# Patient Record
Sex: MALE | Race: WHITE | Employment: FULL TIME | URBAN - METROPOLITAN AREA
[De-identification: names, ages, dates, MRNs, and addresses within clinical notes are randomized per-mention and may not be internally consistent; named-entity substitution may affect disease eponyms.]

---

## 2019-06-19 ENCOUNTER — APPOINTMENT (OUTPATIENT)
Dept: GENERAL RADIOLOGY | Age: 44
End: 2019-06-19
Payer: COMMERCIAL

## 2019-06-19 ENCOUNTER — HOSPITAL ENCOUNTER (EMERGENCY)
Age: 44
Discharge: HOME | End: 2019-06-19
Payer: COMMERCIAL

## 2019-06-19 VITALS
TEMPERATURE: 98.2 F | HEART RATE: 92 BPM | DIASTOLIC BLOOD PRESSURE: 79 MMHG | OXYGEN SATURATION: 96 % | WEIGHT: 195 LBS | BODY MASS INDEX: 24.25 KG/M2 | SYSTOLIC BLOOD PRESSURE: 99 MMHG | HEIGHT: 75 IN | RESPIRATION RATE: 16 BRPM

## 2019-06-19 DIAGNOSIS — S69.92XA INJURY OF LEFT THUMB, INITIAL ENCOUNTER: ICD-10-CM

## 2019-06-19 DIAGNOSIS — S61.012A LACERATION OF LEFT THUMB WITHOUT FOREIGN BODY WITHOUT DAMAGE TO NAIL, INITIAL ENCOUNTER: Primary | ICD-10-CM

## 2019-06-19 PROCEDURE — 73140 X-RAY EXAM OF FINGER(S): CPT

## 2019-06-19 PROCEDURE — 99282 EMERGENCY DEPT VISIT SF MDM: CPT

## 2019-06-19 PROCEDURE — 6370000000 HC RX 637 (ALT 250 FOR IP): Performed by: PHYSICIAN ASSISTANT

## 2019-06-19 PROCEDURE — 4500000027

## 2019-06-19 RX ORDER — BACITRACIN ZINC AND POLYMYXIN B SULFATE 500; 1000 [USP'U]/G; [USP'U]/G
OINTMENT TOPICAL
Qty: 1 TUBE | Refills: 1 | Status: SHIPPED | OUTPATIENT
Start: 2019-06-19

## 2019-06-19 RX ORDER — DIAPER,BRIEF,INFANT-TODD,DISP
EACH MISCELLANEOUS ONCE
Status: COMPLETED | OUTPATIENT
Start: 2019-06-19 | End: 2019-06-19

## 2019-06-19 RX ADMIN — BACITRACIN ZINC: 500 OINTMENT TOPICAL at 17:27

## 2019-06-19 ASSESSMENT — PAIN SCALES - GENERAL: PAINLEVEL_OUTOF10: 5

## 2019-06-19 ASSESSMENT — PAIN DESCRIPTION - ONSET: ONSET: SUDDEN

## 2019-06-19 ASSESSMENT — PAIN DESCRIPTION - LOCATION: LOCATION: FINGER (COMMENT WHICH ONE)

## 2019-06-19 ASSESSMENT — PAIN DESCRIPTION - ORIENTATION: ORIENTATION: LEFT

## 2019-06-19 ASSESSMENT — PAIN DESCRIPTION - PAIN TYPE: TYPE: ACUTE PAIN

## 2019-06-19 ASSESSMENT — PAIN DESCRIPTION - FREQUENCY: FREQUENCY: CONTINUOUS

## 2019-06-19 ASSESSMENT — PAIN DESCRIPTION - DESCRIPTORS: DESCRIPTORS: THROBBING

## 2019-06-19 NOTE — ED PROVIDER NOTES
eMERGENCY dEPARTMENT eNCOUnter        PCP: No primary care provider on file. CHIEF COMPLAINT    Chief Complaint   Patient presents with    Laceration     left thumb       HPI    Ally Bishop is a 40 y.o. male who presents with laceration to left thumb. Context is patient was using a power drill when it slipped off the screw, puncturing the medial aspect of his left thumb, adjacent to the nailbed. Injury occurred immediately prior to arrival.  Pain severity 5 out of 10 without radiation described as a throbbing. Patient denies any other associated injuries. Patient denies distal numbness, tingling, weakness, functional/motor deficit. Patient denies foreign body sensation. Patient endorses tetanus status being up to date. REVIEW OF SYSTEMS    General:   Denies symptoms preceding injury. Denies syncope. Skin: + Laceration. See HPI. Musculoskeletal:  No distal numbness, tingling. No obvious tendon or motor deficits. Denies any other musculoskeletal injuries or skin trauma. All other review of systems are negative  See HPI and nursing notes for additional information     [address]    History reviewed. No pertinent past medical history. History reviewed. No pertinent surgical history. CURRENT MEDICATIONS        ALLERGIES    No Known Allergies    SOCIAL & FAMILY HISTORY    Social History     Socioeconomic History    Marital status:      Spouse name: None    Number of children: None    Years of education: None    Highest education level: None   Occupational History    None   Social Needs    Financial resource strain: None    Food insecurity:     Worry: None     Inability: None    Transportation needs:     Medical: None     Non-medical: None   Tobacco Use    Smoking status: Never Smoker    Smokeless tobacco: Never Used   Substance and Sexual Activity    Alcohol use: Yes     Comment:  Occ    Drug use: Never    Sexual activity: None   Lifestyle    Physical activity:     Days per week: None     Minutes per session: None    Stress: None   Relationships    Social connections:     Talks on phone: None     Gets together: None     Attends Synagogue service: None     Active member of club or organization: None     Attends meetings of clubs or organizations: None     Relationship status: None    Intimate partner violence:     Fear of current or ex partner: None     Emotionally abused: None     Physically abused: None     Forced sexual activity: None   Other Topics Concern    None   Social History Narrative    None     History reviewed. No pertinent family history. PHYSICAL EXAM    VITAL SIGNS: BP 99/79   Pulse 92   Temp 98.2 °F (36.8 °C) (Oral)   Resp 16   Ht 6' 3\" (1.905 m)   Wt 195 lb (88.5 kg)   SpO2 96%   BMI 24.37 kg/m²   Constitutional:  Well developed, Appears comfortable  HEENT:  Normocephalic, Atraumatic. PERRL, EOMI. Ear canals, nasal passages, oropharynx clear of blood or clear fluid. Musculoskeletal:  No gross deformities. No motor deficits. Distal sensation and capillary refill intact. Affected hand with full abduction and adduction of digits. Full flexion and extension. Vascular: Distal pulses and capillary refill intact. Integument:     There is a laceration to left thumb along medial nail fold measuring approximately 0.5 centimeters in length without obvious tendon involvement or foreign body on initial inspection. No subungual hematoma. Distal sensation, capillary refill intact. Distal joints with full range of motion. See below for further details. Pad of digit with small area of bruising with laceration noted, approximately 0.5 cm in length with small amount of active bleeding. No evidence of tendon involvement or foreign body on initial inspection. Wound appears superficial, not a through and through wound. Neurologic:  Awake and alert, normal flow of speech. CN 2-12 intact.   Sensation intact along superficial branch of radial nerve, median nerve and ulnar nerve. Psychiatric: Cooperative, pleasant affect        RADIOLOGY/PROCEDURES    XR FINGER LEFT (MIN 2 VIEWS)   Final Result   No acute radiographic finding in the left hand.           ________________________________________________________________________       Procedure Note - Lynnette Chow PA-C      Laceration Repair Procedure Note    Indication: Skin Laceration - left thumb    Procedure:   - Procedure explained, including risks and benefits explained to the patient who expressed understanding. All questions were answered. Verbal consent obtained. Time out was performed prior to starting the procedure. - The Wound was prepped and draped in the usual sterile fashion using Betadine and sterile saline.  - The wound is anesthetized using digital block with 2% lidocaine without epinephrine, approximately 5 mL  - Wound was explored to it's depth,  no compromise of neurovascular or tendon structures, no foreign bodies. - Wound was irrigated with copious amounts of sterile saline and mechanically debrided utilizing sterile gauze and sterile Q-tips. - Nail fold closed with 4-0 Prolene sutures, total number of 2, simple interrupted  - The pad of digit laceration was closed with 4-0 prolene sutures, total number of 1,  simple interrupted  - Hemostasis and good cosmesis was achieved. Blood loss minimal.  - The wound area was then dressed with sterile nonstick dressing, sterile gauze, and tape. - Patient tolerated procedure well without complications. Total repaired wound length: 1 cm  ________________________________________________________________________            ED COURSE & MEDICAL DECISION MAKING       Vital signs and nursing notes reviewed during ED course. I have independently evaluated this patient . Supervising MD present in the Emergency Department, available for consultation, throughout entirety of  patient care.      Patient presents today for laceration which was repaired while in ED today. He has wound to lateral nail fold as well as to pad of digit, does not appear to be a through and through laceration, both wounds are superficial.  Imaging without acute osseous abnormality. Patient believes he is up-to-date on tetanus, this is not administered today. I discussed possibility of infection, retained foreign body, tendon injury, nerve injury. I discussed wound care instructions today with patient and/or family. Patient and/or family agrees to having a wound check in 2-3 days and suture/staple removal in 7-10 days (which we discussed today). We also discussed scars and ways to minimize scarring including but not limited to protection of scar from sunlight for one year and massaging of scar tissue. Disposition, diagnosis, and plan discussed at bedside with patient and/or the family today who understands and agrees. Return to emergency department precautions were discussed in detail with patient and/or family who understands and agrees to return for new or worsening symptoms including but not limited to numbness, weakness, tingling, fever, chills, redness around wound, streaking redness proximal or distal to wound, purulent drainage from wound, nausea, vomiting, joint redness, joint swelling. Clinical  IMPRESSION    1. Laceration of left thumb without foreign body without damage to nail, initial encounter    2. Injury of left thumb, initial encounter           Comment: Please note this report has been produced using speech recognition software and may contain errors related to that system including errors in grammar, punctuation, and spelling, as well as words and phrases that may be inappropriate. If there are any questions or concerns please feel free to contact the dictating provider for clarification.         LILLIAN Cassidy  06/19/19 5417

## 2019-06-19 NOTE — ED TRIAGE NOTES
Was working on a project and electrical screwdriver lacerated left thumb, bleeding in control at triage.

## 2023-02-04 ENCOUNTER — HOSPITAL ENCOUNTER (EMERGENCY)
Age: 48
Discharge: HOME | End: 2023-02-04
Payer: COMMERCIAL

## 2023-02-04 VITALS
OXYGEN SATURATION: 95 % | RESPIRATION RATE: 15 BRPM | HEART RATE: 83 BPM | HEIGHT: 75 IN | DIASTOLIC BLOOD PRESSURE: 68 MMHG | TEMPERATURE: 98.1 F | WEIGHT: 180 LBS | BODY MASS INDEX: 22.38 KG/M2 | SYSTOLIC BLOOD PRESSURE: 124 MMHG

## 2023-02-04 DIAGNOSIS — T15.92XA FOREIGN BODY, EYE, LEFT, INITIAL ENCOUNTER: Primary | ICD-10-CM

## 2023-02-04 DIAGNOSIS — H57.12 PAIN AROUND LEFT EYE: ICD-10-CM

## 2023-02-04 PROCEDURE — 6370000000 HC RX 637 (ALT 250 FOR IP): Performed by: NURSE PRACTITIONER

## 2023-02-04 PROCEDURE — 99283 EMERGENCY DEPT VISIT LOW MDM: CPT

## 2023-02-04 PROCEDURE — 2580000003 HC RX 258: Performed by: NURSE PRACTITIONER

## 2023-02-04 RX ORDER — ERYTHROMYCIN 5 MG/G
OINTMENT OPHTHALMIC ONCE
Status: COMPLETED | OUTPATIENT
Start: 2023-02-04 | End: 2023-02-04

## 2023-02-04 RX ORDER — TETRACAINE HYDROCHLORIDE 5 MG/ML
2 SOLUTION OPHTHALMIC ONCE
Status: COMPLETED | OUTPATIENT
Start: 2023-02-04 | End: 2023-02-04

## 2023-02-04 RX ORDER — SODIUM CHLORIDE 9 MG/ML
INJECTION, SOLUTION INTRAVENOUS CONTINUOUS
Status: DISCONTINUED | OUTPATIENT
Start: 2023-02-04 | End: 2023-02-04 | Stop reason: HOSPADM

## 2023-02-04 RX ADMIN — SODIUM CHLORIDE: 9 INJECTION, SOLUTION INTRAVENOUS at 14:18

## 2023-02-04 RX ADMIN — FLUORESCEIN SODIUM 1 MG: 1 STRIP OPHTHALMIC at 13:24

## 2023-02-04 RX ADMIN — ERYTHROMYCIN: 5 OINTMENT OPHTHALMIC at 14:44

## 2023-02-04 RX ADMIN — TETRACAINE HYDROCHLORIDE 2 DROP: 5 SOLUTION OPHTHALMIC at 13:24

## 2023-02-04 ASSESSMENT — PAIN DESCRIPTION - LOCATION
LOCATION: EYE
LOCATION: EYE

## 2023-02-04 ASSESSMENT — VISUAL ACUITY
OS: 20/20
OD: 20/20
OU: 20/16

## 2023-02-04 ASSESSMENT — PAIN DESCRIPTION - ORIENTATION
ORIENTATION: RIGHT
ORIENTATION: RIGHT

## 2023-02-04 ASSESSMENT — PAIN DESCRIPTION - DESCRIPTORS
DESCRIPTORS: BURNING
DESCRIPTORS: BURNING

## 2023-02-04 ASSESSMENT — PAIN DESCRIPTION - FREQUENCY: FREQUENCY: CONTINUOUS

## 2023-02-04 ASSESSMENT — PAIN - FUNCTIONAL ASSESSMENT: PAIN_FUNCTIONAL_ASSESSMENT: 0-10

## 2023-02-04 ASSESSMENT — PAIN SCALES - GENERAL
PAINLEVEL_OUTOF10: 2
PAINLEVEL_OUTOF10: 2

## 2023-02-04 NOTE — ED PROVIDER NOTES
EMERGENCY DEPARTMENT ENCOUNTER      PCP: No primary care provider on file. CHIEF COMPLAINT    Chief Complaint   Patient presents with    Eye Problem     Latex paint splashed in eye         This patient was not evaluated by the attending physician. I have independently evaluated this patient . HPI    Bari Reaves is a 52 y.o. male who presents with complaints of right eye pain. Onset was just prior to arrival.  She was at work and a pain bucket dropped splashing in his right eye. He states he developed immediate discomfort. He did irrigate the eye with some saline. He is currently complaining of pain he rates 1/10, aching and irritated, and constant. Nothing has alleviated or exacerbated his symptoms. He denies any vision changes. The duration has been since onset. No visual deficits or loss of visual field. He states his tetanus vaccination is up-to-date. REVIEW OF SYSTEMS    General: No fevers or chills  ENT:-See HPI  GI: No abdominal pain , nausea or vomiting  Skin: No new rash  Pulmonary: No shortness of breath or new cough    All other review of systems are negative  See HPI and nursing notes for additional information     PAST MEDICAL and SURGICAL HISTORY    History reviewed. No pertinent past medical history. History reviewed. No pertinent surgical history. CURRENT MEDICATIONS    Current Outpatient Rx   Medication Sig Dispense Refill    bacitracin-polymyxin b (POLYSPORIN) [id number] UNIT/GM ointment Apply topically 2 times daily. 1 Tube 1       ALLERGIES    No Known Allergies    SOCIAL and FAMILY HISTORY    Social History     Socioeconomic History    Marital status:      Spouse name: None    Number of children: None    Years of education: None    Highest education level: None   Tobacco Use    Smoking status: Never    Smokeless tobacco: Never   Substance and Sexual Activity    Alcohol use: Yes     Comment: Occ    Drug use: Never     History reviewed.  No pertinent family history. PHYSICAL EXAM    VITAL SIGNS: /68 Comment: Simultaneous filing. User may not have seen previous data. Pulse 83 Comment: Simultaneous filing. User may not have seen previous data. Temp 98.1 °F (36.7 °C) (Oral) Comment: Simultaneous filing. User may not have seen previous data. Comment (Src): Simultaneous filing. User may not have seen previous data. Resp 15 Comment: Simultaneous filing. User may not have seen previous data. Ht 6' 3\" (1.905 m)   Wt 180 lb (81.6 kg)   SpO2 95% Comment: Simultaneous filing. User may not have seen previous data. BMI 22.50 kg/m²   Constitutional:  Well developed, well nourished, no acute distress,     Eyes:  Pupils equally round and reactive to light, sclerae nonicteric, conjunctiva moist   Right conjunctiva  injected without obvious corneal deficit, hypopyon, hyphema. EOMI. No foreign bodies or obvious corneal deficit. No hyphema seen on tangential light    HENT:  Atraumatic, external ears normal, nose normal, oropharynx moist, no pharyngeal exudates. Neck/Lymphatics: supple, no JVD, no swollen nodes   Respiratory:  No retractions  Cardiovascular:  No JVD  Integument:  Warm dry skin, no obvious rash, no evidence of Patterson sign  Neurologic:  No slurred speech, normal gait       Visual acuity:  See nurses note      RADIOLOGY/PROCEDURES      Slit Lamp Exam Procedure Note    Indication: eye redness, pain  Procedure: The patient was placed in the appropriate position. Anesthesia was tetracaine. No eyelid abnormality or foreign body on lid eversion. No obvious corneal, sclera, iris defects on slit lamp visualization No cells/flare. No hyphema. Fluorescein staining was performed and revealed reuptake in right eye to 7 o'clock position. Negative Dianne sign. The patient tolerated the procedure well.   Complications: None        ED COURSE & MEDICAL DECISION MAKING        CC/HPI Summary, DDx, ED Course, and Reassessment:   26-year-old male presents to the emergency department with complaints of right eye irritation after splashing latex paint in his eye. It is water-based. He did irrigate his eye prior to arrival.  Current pain is 1/10. Tetanus vaccination is up-to-date. pH of the eye was normal.  Fluorescein staining revealed a small abrasion at the 7 o'clock position. Right eye was irrigated with 500 mL of saline and he was started on erythromycin ointment 4 times daily for 5 days. He was instructed to follow-up with his ophthalmologist on Monday or Tuesday and to call Monday morning to schedule an appointment. He was instructed to return here with any worsening symptoms. He verbalized understanding and agrees to plan of care. I did offer to call ophthalmologist on     History from : Patient    Limitations to history : None    Patient was given the following medications:  Medications   0.9 % sodium chloride infusion (0 mL/hr Ophthalmic Stopped 2/4/23 1445)   tetracaine (TETRAVISC) 0.5 % ophthalmic solution 2 drop (2 drops Both Eyes Given 2/4/23 1324)   fluorescein ophthalmic strip 1 mg (1 mg Both Eyes Given 2/4/23 1324)   erythromycin [facility]) ophthalmic ointment ( Right Eye Given 2/4/23 1444)       Chronic conditions affecting care: none    Discussion with Other Profesionals : None    Social Determinants : None    Records Reviewed : None    Disposition Considerations (tests considered but not done, Shared Decision Making, Pt Expectation of Test or Tx.):   Appropriate for outpatient management the pH within the eye is normal.  There is a small abrasion I will treat with erythromycin. No vision changes. Pain is minimal at 1/10. At this time I do feel he can be managed outpatient. I did offer to call her ophthalmologist on-call, however he states he would rather follow-up with his own ophthalmologist.  He was instructed to call them Monday morning and schedule follow-up appointment for later that day or Tuesday.   He was instructed to return here with worsening symptoms. He was instructed to use erythromycin ointment 4 times daily for the next 5 days. He verbalized understanding and agrees with plan of care peer    I am the Primary Clinician of Record. Discharge with medication and followup with ophthalmologist (see EMR)      Patient agrees to return emergency department if symptoms worsen or any new symptoms develop. Vital signs and nursing notes reviewed during ED course. Differential diagnosis: Iritis, Conjunctivitis, Herpes Keratitis, Corneal Ulcer, Corneal Abrasion, Acute Angle Glaucoma, Orbital Cellulitis/Abscess, Periorbital Cellulitis, other. Clinical  IMPRESSION    1. Foreign body, eye, left, initial encounter    2. Pain around left eye              Comment: Please note this report has been produced using speech recognition software and may contain errors related to that system including errors in grammar, punctuation, and spelling, as well as words and phrases that may be inappropriate. If there are any questions or concerns please feel free to contact the dictating provider for clarification.        MADELIN Sahni - SCOTTIE  02/04/23 7002

## 2023-02-04 NOTE — DISCHARGE INSTRUCTIONS
Erythromycin ointment. Apply a thin layer to the left lower conjunctivofour times daily for 5 days. Follow-up with ophthalmology Monday or Tuesday. Call first thing Monday morning to schedule an appointment.   Return to the emergency department with any worsening symptoms

## 2023-02-04 NOTE — Clinical Note
Jammie Rueda was seen and treated in our emergency department on 2/4/2023. He may return to work on 02/06/2023. If you have any questions or concerns, please don't hesitate to call.       Trenton Dhillon, APRN - CNP